# Patient Record
Sex: MALE | Race: WHITE
[De-identification: names, ages, dates, MRNs, and addresses within clinical notes are randomized per-mention and may not be internally consistent; named-entity substitution may affect disease eponyms.]

---

## 2018-09-10 ENCOUNTER — HOSPITAL ENCOUNTER (EMERGENCY)
Dept: HOSPITAL 62 - ER | Age: 28
Discharge: HOME | End: 2018-09-10
Payer: OTHER GOVERNMENT

## 2018-09-10 VITALS — DIASTOLIC BLOOD PRESSURE: 88 MMHG | SYSTOLIC BLOOD PRESSURE: 137 MMHG

## 2018-09-10 DIAGNOSIS — M54.41: Primary | ICD-10-CM

## 2018-09-10 DIAGNOSIS — M54.10: ICD-10-CM

## 2018-09-10 DIAGNOSIS — Z98.890: ICD-10-CM

## 2018-09-10 DIAGNOSIS — Z79.899: ICD-10-CM

## 2018-09-10 DIAGNOSIS — F17.200: ICD-10-CM

## 2018-09-10 PROCEDURE — 99283 EMERGENCY DEPT VISIT LOW MDM: CPT

## 2018-09-10 PROCEDURE — 96372 THER/PROPH/DIAG INJ SC/IM: CPT

## 2018-09-10 NOTE — ER DOCUMENT REPORT
HPI





- HPI


Pain Level: 5


Context: 


Patient is a 27-year-old male complaining of severe low back pain and spasms.  

Patient reports the pain started when he woke up this morning.  Patient does 

have a history of herniated disc at L5-S1 which he had surgery on.  Patient 

reports he does get muscle spasms.  This pain is familiar.  No bowel or bladder 

change.  No fever.  Positive right leg radiculopathy but this is not new for 

the patient.  No paresthesias.  Patient is presently taking gabapentin, 

baclofen without relief





Associated Symptoms: None


Exacerbated by: Denies


Relieved by: Denies





- ROS


Systems Reviewed and Negative: Yes All other systems reviewed and negative





Past Medical History





- General


Information source: Patient





- Social History


Smoking Status: Current Every Day Smoker


Frequency of alcohol use: None


Drug Abuse: None


Lives with: Family


Family History: Reviewed & Not Pertinent





Vertical Provider Document





- CONSTITUTIONAL


Agree With Documented VS: Yes


Exam Limitations: No Limitations


General Appearance: Mild Distress





- INFECTION CONTROL


TRAVEL OUTSIDE OF THE [place] IN LAST 30 DAYS: Yes





- HEENT


HEENT: Atraumatic





- NECK


Neck: Normal Inspection, Supple





- RESPIRATORY


Respiratory: Breath Sounds Normal, No Respiratory Distress





- CARDIOVASCULAR


Cardiovascular: Regular Rate, Regular Rhythm





- BACK


Back: Abnormal Inspection


Notes: 





No vertebral tenderness.  Positive lumbar paraspinal tenderness.





Course





- Re-evaluation


Re-evalutation: 





09/10/18 16:18


Patient presents with acute low back pain without signs of spinal cord 

compression, cauda equina, infection, aneurysm or other serious etiology.  

Patient is neurologically intact, independently and steadily ambulatory without 

paresthesias or neurologic deficits.  No emergent testing or imaging is 

indicated at this time.  Home care, follow-up with primary care and ED return 

precautions were discussed with the patient.  Patient is agreeable with plan 

and stable for discharge.  Short course of pain medication provided





- Vital Signs


Vital signs: 


 











Temp Pulse Resp BP Pulse Ox


 


 98.4 F   90   16   137/88 H  98 


 


 09/10/18 16:04  09/10/18 16:04  09/10/18 16:04  09/10/18 16:04  09/10/18 16:04














Discharge





- Discharge


Clinical Impression: 


Low back pain


Qualifiers:


 Chronicity: acute Back pain laterality: midline Sciatica presence: with 

sciatica Sciatica laterality: sciatica of right side Qualified Code(s): M54.41 

- Lumbago with sciatica, right side





Condition: Stable


Disposition: HOME, SELF-CARE


Instructions:  Ice Packs ([facility]), Oral Narcotic Medication ([facility]), Low Back Pain (

[facility]), Muscle Relaxers ([facility])


Additional Instructions: 


Short course of pain medication as prescribed.  Take as needed for severe pain


Stop your baclofen and take Valium instead as prescribed


Alternate ice and heat to the sore areas to help relax the muscle


Follow-up with your primary care if pain persists


Prescriptions: 


Diazepam [Valium 5 Mg Tablet] 5 mg PO TID #20 tablet


Oxycodone HCl/Acetaminophen [Percocet 5-325 mg Tablet] 1 tab PO Q6H PRN #10 

tablet


 PRN Reason: 


Forms:  Return to Work

## 2019-01-18 ENCOUNTER — HOSPITAL ENCOUNTER (OUTPATIENT)
Dept: HOSPITAL 62 - RAD | Age: 29
End: 2019-01-18
Attending: PHYSICIAN ASSISTANT
Payer: OTHER GOVERNMENT

## 2019-01-18 DIAGNOSIS — M51.26: Primary | ICD-10-CM

## 2019-01-18 PROCEDURE — 72148 MRI LUMBAR SPINE W/O DYE: CPT

## 2019-01-18 NOTE — RADIOLOGY REPORT (SQ)
EXAM DESCRIPTION:  MRI LUMBAR SPINE WITHOUT



COMPLETED DATE/TIME:  1/18/2019 10:10 am



REASON FOR STUDY:  LOW BACK PAIN M54.5  LOW BACK PAIN



COMPARISON:  None.



TECHNIQUE:  Sagittal and Axial imaging includes T1, T2, STIR and gradient echo sequences. Coronal T2/
HASTE imaging.



LIMITATIONS:  None.



FINDINGS:  VISUALIZED UPPER ABDOMEN:  Limited evaluation. No acute or suspicious findings suggested.

SEGMENTATION: No transitional anatomy. The lowest well-developed disc space is labeled L5-S1.

ALIGNMENT: Anatomic.

VERTEBRAE: Intact.

BONE MARROW: Normal. No marrow replacement or reactive changes.

DISC SIGNAL: There is mild disc space narrowing at L4-5 and L5-S1.

POSTERIOR ELEMENTS:  Generally intact.  No pars defect evident.

HARDWARE: None in the spine.

CORD AND CONUS: Normal in size and signal intensity. Conus at the T12-L1 level.

SOFT TISSUES: No aortic aneurysm seen. No bulky retroperitoneal adenopathy or mass. No paraspinal mas
s or fluid.

L1-L2: No significant spinal stenosis or exit foraminal stenosis.

L2-L3: No significant spinal stenosis or exit foraminal stenosis.

L3-L4: No significant spinal stenosis or exit foraminal stenosis.

L4-L5: Left paracentral disc protrusion.  This appears to displace the traversing nerve root on the l
eft.  There is no foraminal stenosis.

L5-S1: No significant spinal stenosis or exit foraminal stenosis.

LOWER THORACIC: Incompletely imaged.  No stenosis seen.

SACRUM: Visualized upper sacrum intact.

OTHER: No other significant findings.



IMPRESSION:  Left paracentral disc protrusion at L4-5 that appears to displace the traversing nerve r
oot.



TECHNICAL DOCUMENTATION:  JOB ID:  [id number]

 [employer]- All Rights Reserved



Reading location - IP/workstation name: [email or web address]